# Patient Record
Sex: FEMALE | URBAN - NONMETROPOLITAN AREA
[De-identification: names, ages, dates, MRNs, and addresses within clinical notes are randomized per-mention and may not be internally consistent; named-entity substitution may affect disease eponyms.]

---

## 2024-03-13 ENCOUNTER — APPOINTMENT (RX ONLY)
Dept: URBAN - NONMETROPOLITAN AREA CLINIC 4 | Facility: CLINIC | Age: 29
Setting detail: DERMATOLOGY
End: 2024-03-13

## 2024-03-13 DIAGNOSIS — L259 CONTACT DERMATITIS AND OTHER ECZEMA, UNSPECIFIED CAUSE: ICD-10-CM | Status: INADEQUATELY CONTROLLED

## 2024-03-13 PROBLEM — L30.8 OTHER SPECIFIED DERMATITIS: Status: ACTIVE | Noted: 2024-03-13

## 2024-03-13 PROCEDURE — 99204 OFFICE O/P NEW MOD 45 MIN: CPT

## 2024-03-13 PROCEDURE — ? PRESCRIPTION MEDICATION MANAGEMENT

## 2024-03-13 PROCEDURE — ? COUNSELING

## 2024-03-13 PROCEDURE — ? PRESCRIPTION

## 2024-03-13 PROCEDURE — ? PHOTO-DOCUMENTATION

## 2024-03-13 RX ORDER — CLOBETASOL PROPIONATE 0.5 MG/G
0.05% CREAM TOPICAL BID
Qty: 1 | Refills: 3 | Status: ERX | COMMUNITY
Start: 2024-03-13

## 2024-03-13 RX ADMIN — CLOBETASOL PROPIONATE 0.05: 0.5 CREAM TOPICAL at 00:00

## 2024-03-13 ASSESSMENT — LOCATION DETAILED DESCRIPTION DERM: LOCATION DETAILED: RIGHT LATERAL DORSAL FOOT

## 2024-03-13 ASSESSMENT — BSA RASH: BSA RASH: 4

## 2024-03-13 ASSESSMENT — LOCATION ZONE DERM: LOCATION ZONE: FEET

## 2024-03-13 ASSESSMENT — LOCATION SIMPLE DESCRIPTION DERM: LOCATION SIMPLE: RIGHT FOOT

## 2024-03-13 ASSESSMENT — SEVERITY ASSESSMENT 2020: SEVERITY 2020: MILD

## 2024-03-13 ASSESSMENT — ITCH NUMERIC RATING SCALE: HOW SEVERE IS YOUR ITCHING?: 4

## 2024-03-13 ASSESSMENT — PAIN INTENSITY VAS: HOW INTENSE IS YOUR PAIN 0 BEING NO PAIN, 10 BEING THE MOST SEVERE PAIN POSSIBLE?: 4/10 PAIN

## 2024-03-13 NOTE — PROCEDURE: PRESCRIPTION MEDICATION MANAGEMENT
Plan: Recommend to start Zyrtec, or Allegra.
Initiate Treatment: Clobetasol 0.05% cream BID
Render In Strict Bullet Format?: No
Detail Level: Zone

## 2024-04-24 ENCOUNTER — APPOINTMENT (RX ONLY)
Dept: URBAN - NONMETROPOLITAN AREA CLINIC 4 | Facility: CLINIC | Age: 29
Setting detail: DERMATOLOGY
End: 2024-04-24

## 2024-04-24 DIAGNOSIS — L259 CONTACT DERMATITIS AND OTHER ECZEMA, UNSPECIFIED CAUSE: ICD-10-CM | Status: RESOLVING

## 2024-04-24 PROBLEM — L30.8 OTHER SPECIFIED DERMATITIS: Status: ACTIVE | Noted: 2024-04-24

## 2024-04-24 PROCEDURE — ? PRESCRIPTION

## 2024-04-24 PROCEDURE — ? PRESCRIPTION MEDICATION MANAGEMENT

## 2024-04-24 PROCEDURE — ? COUNSELING

## 2024-04-24 PROCEDURE — 99213 OFFICE O/P EST LOW 20 MIN: CPT

## 2024-04-24 RX ORDER — CLOBETASOL PROPIONATE 0.5 MG/G
0.05% CREAM TOPICAL QD
Qty: 60 | Refills: 1 | Status: ERX

## 2024-04-24 ASSESSMENT — LOCATION SIMPLE DESCRIPTION DERM: LOCATION SIMPLE: RIGHT PRETIBIAL REGION

## 2024-04-24 ASSESSMENT — LOCATION DETAILED DESCRIPTION DERM: LOCATION DETAILED: RIGHT LATERAL DISTAL PRETIBIAL REGION

## 2024-04-24 ASSESSMENT — LOCATION ZONE DERM: LOCATION ZONE: LEG

## 2024-04-24 NOTE — PROCEDURE: PRESCRIPTION MEDICATION MANAGEMENT
Render In Strict Bullet Format?: No
Modify Regimen: Clobetasol 0.05% cream mixed 50/50 with moisturizer of choice QD x 4 weeks, then discontinue
Detail Level: Zone